# Patient Record
Sex: MALE
[De-identification: names, ages, dates, MRNs, and addresses within clinical notes are randomized per-mention and may not be internally consistent; named-entity substitution may affect disease eponyms.]

---

## 2018-07-25 ENCOUNTER — HOSPITAL ENCOUNTER (EMERGENCY)
Dept: HOSPITAL 14 - H.ER | Age: 33
Discharge: HOME | End: 2018-07-25
Payer: COMMERCIAL

## 2018-07-25 VITALS
DIASTOLIC BLOOD PRESSURE: 76 MMHG | HEART RATE: 63 BPM | OXYGEN SATURATION: 98 % | SYSTOLIC BLOOD PRESSURE: 134 MMHG | RESPIRATION RATE: 19 BRPM

## 2018-07-25 VITALS — TEMPERATURE: 97.7 F
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
## 2018-07-25 DIAGNOSIS — Z88.0: ICD-10-CM

## 2018-07-25 DIAGNOSIS — M54.5: Primary | ICD-10-CM

## 2018-07-25 DIAGNOSIS — M51.37: ICD-10-CM
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
## 2018-07-25 RX ADMIN — OXYCODONE HYDROCHLORIDE AND ACETAMINOPHEN STA TAB: 5; 325 TABLET ORAL at 13:04
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
